# Patient Record
Sex: FEMALE | Employment: FULL TIME | URBAN - METROPOLITAN AREA
[De-identification: names, ages, dates, MRNs, and addresses within clinical notes are randomized per-mention and may not be internally consistent; named-entity substitution may affect disease eponyms.]

---

## 2020-10-02 ENCOUNTER — TELEPHONE (OUTPATIENT)
Dept: FAMILY MEDICINE CLINIC | Age: 35
End: 2020-10-02

## 2020-10-02 NOTE — TELEPHONE ENCOUNTER
----- Message from Blaire Trejo sent at 9/28/2020  2:45 PM EDT -----  Regarding: Dr. Deborah Ramsey  Appointment not available    Caller's first and last name and relationship to patient (if not the patient):      Best contact number: [phone]      Preferred date and time: first available       Scheduled appointment date and time: N/A      Reason for appointment: Pt is looking for a NP appointment and her insurance would not process within the system when first entered     [facility]  Member ID #[id number]  GRP# [id number]      Details to clarify the request:      Blaire Trejo